# Patient Record
Sex: FEMALE
[De-identification: names, ages, dates, MRNs, and addresses within clinical notes are randomized per-mention and may not be internally consistent; named-entity substitution may affect disease eponyms.]

---

## 2019-09-12 PROBLEM — Z00.00 ENCOUNTER FOR PREVENTIVE HEALTH EXAMINATION: Status: ACTIVE | Noted: 2019-09-12

## 2019-09-13 ENCOUNTER — APPOINTMENT (OUTPATIENT)
Dept: OTOLARYNGOLOGY | Facility: CLINIC | Age: 64
End: 2019-09-13
Payer: MEDICAID

## 2019-09-13 VITALS
WEIGHT: 142 LBS | HEIGHT: 66 IN | SYSTOLIC BLOOD PRESSURE: 139 MMHG | DIASTOLIC BLOOD PRESSURE: 99 MMHG | HEART RATE: 87 BPM | BODY MASS INDEX: 22.82 KG/M2

## 2019-09-13 DIAGNOSIS — Z87.891 PERSONAL HISTORY OF NICOTINE DEPENDENCE: ICD-10-CM

## 2019-09-13 DIAGNOSIS — I10 ESSENTIAL (PRIMARY) HYPERTENSION: ICD-10-CM

## 2019-09-13 DIAGNOSIS — H93.8X1 OTHER SPECIFIED DISORDERS OF RIGHT EAR: ICD-10-CM

## 2019-09-13 DIAGNOSIS — Z82.49 FAMILY HISTORY OF ISCHEMIC HEART DISEASE AND OTHER DISEASES OF THE CIRCULATORY SYSTEM: ICD-10-CM

## 2019-09-13 PROCEDURE — 99204 OFFICE O/P NEW MOD 45 MIN: CPT

## 2019-09-13 PROCEDURE — 92557 COMPREHENSIVE HEARING TEST: CPT

## 2019-09-13 PROCEDURE — 92550 TYMPANOMETRY & REFLEX THRESH: CPT

## 2019-09-13 RX ORDER — FLUTICASONE PROPION/SALMETEROL 250-50 MCG
250-50 BLISTER, WITH INHALATION DEVICE INHALATION
Refills: 0 | Status: ACTIVE | COMMUNITY

## 2019-09-13 RX ORDER — ALBUTEROL 90 MCG
90 AEROSOL (GRAM) INHALATION
Refills: 0 | Status: ACTIVE | COMMUNITY

## 2019-09-13 RX ORDER — AMLODIPINE BESYLATE 5 MG/1
5 TABLET ORAL
Refills: 0 | Status: ACTIVE | COMMUNITY

## 2019-09-13 NOTE — ASSESSMENT
[FreeTextEntry1] : 64F here for initial evaluation. One month ago, while cleaning her right ear with a qtip she heard an 'explosion' with very bad pain. This was then followed by swelling and pressure around her right ear and face, which persisted several days and she also developed a 'bag of fluid under her chin.' She saw PCP who gave amoxicillin and her sx improved. slowly. The right ear pressure is still there but nearly completely resolved and she is here in followup. There is no otorrhea, otalgia, tinnitus or vertigo. Audiogram from today is completely normal. On exam, there is a small scab along mid-anterior right EAC wall, but the EAC is clear and the rest of the ear exam and head and neck exam is normal. Pt reassured. Exam normal. Avoid qtips, digital trauma to ears and keep ears dry. RTO as needed.

## 2019-09-13 NOTE — CONSULT LETTER
[Dear  ___] : Dear  [unfilled], [Courtesy Letter:] : I had the pleasure of seeing your patient, [unfilled], in my office today. [Consult Closing:] : Thank you very much for allowing me to participate in the care of this patient.  If you have any questions, please do not hesitate to contact me. [Sincerely,] : Sincerely, [FreeTextEntry3] : Juan Carlos Orellana MD\par Department of Otolaryngology - Head and Neck Surgery\par [facility]

## 2019-09-13 NOTE — HISTORY OF PRESENT ILLNESS
[de-identified] : 64F here for initial evaluation.\par \par One month ago, while cleaning her right ear with a qtip she heard an 'explosion' with very bad pain. This was then followed by swelling and pressure around her right ear and face, which persisted several days. She saw PCP who gave amoxicillin and her sx improved.\par She then developed bronchitis and took zpak which she finished two days ago.\par \par The right ear pressure has persisted and she is here in followup. There is no otorrhea, otalgia, tinnitus or vertigo.\par \par Audiogram from today:\par hearing symmetric and wnl. SRT 20, 100% discrim, type A tymps\par \par ROS otherwise unremarkable.\par

## 2019-09-13 NOTE — PHYSICAL EXAM
[Midline] : trachea located in midline position [Normal] : no rashes [de-identified] : flat, no masses/lesions [FreeTextEntry1] : Ad: EAC clear and dry; small scab along mid-anterior EAC wall. TM intact and mobile, ME clear\par As: EAC clear and dry, TM intact and mobile, ME clear

## 2019-12-13 ENCOUNTER — APPOINTMENT (OUTPATIENT)
Dept: PULMONOLOGY | Facility: CLINIC | Age: 64
End: 2019-12-13

## 2021-09-21 ENCOUNTER — TRANSCRIPTION ENCOUNTER (OUTPATIENT)
Age: 66
End: 2021-09-21

## 2021-09-22 ENCOUNTER — OUTPATIENT (OUTPATIENT)
Dept: OUTPATIENT SERVICES | Facility: HOSPITAL | Age: 66
LOS: 1 days | Discharge: HOME | End: 2021-09-22